# Patient Record
Sex: FEMALE | URBAN - NONMETROPOLITAN AREA
[De-identification: names, ages, dates, MRNs, and addresses within clinical notes are randomized per-mention and may not be internally consistent; named-entity substitution may affect disease eponyms.]

---

## 2022-04-01 ENCOUNTER — OFFICE VISIT (OUTPATIENT)
Dept: URBAN - NONMETROPOLITAN AREA CLINIC 2 | Facility: CLINIC | Age: 62
End: 2022-04-01

## 2022-06-02 ENCOUNTER — WEB ENCOUNTER (OUTPATIENT)
Dept: URBAN - NONMETROPOLITAN AREA CLINIC 13 | Facility: CLINIC | Age: 62
End: 2022-06-02

## 2022-06-08 ENCOUNTER — OFFICE VISIT (OUTPATIENT)
Dept: URBAN - NONMETROPOLITAN AREA CLINIC 13 | Facility: CLINIC | Age: 62
End: 2022-06-08
Payer: MEDICARE

## 2022-06-08 ENCOUNTER — WEB ENCOUNTER (OUTPATIENT)
Dept: URBAN - NONMETROPOLITAN AREA CLINIC 13 | Facility: CLINIC | Age: 62
End: 2022-06-08

## 2022-06-08 ENCOUNTER — LAB OUTSIDE AN ENCOUNTER (OUTPATIENT)
Dept: URBAN - NONMETROPOLITAN AREA CLINIC 13 | Facility: CLINIC | Age: 62
End: 2022-06-08

## 2022-06-08 VITALS
DIASTOLIC BLOOD PRESSURE: 85 MMHG | HEART RATE: 69 BPM | WEIGHT: 144 LBS | BODY MASS INDEX: 27.19 KG/M2 | SYSTOLIC BLOOD PRESSURE: 131 MMHG | HEIGHT: 61 IN

## 2022-06-08 DIAGNOSIS — Z12.11 COLON CANCER SCREENING: ICD-10-CM

## 2022-06-08 DIAGNOSIS — K52.9 INFLAMMATORY BOWEL DISEASE: ICD-10-CM

## 2022-06-08 DIAGNOSIS — R19.5 LOOSE BOWEL MOVEMENTS: ICD-10-CM

## 2022-06-08 PROCEDURE — 99204 OFFICE O/P NEW MOD 45 MIN: CPT | Performed by: INTERNAL MEDICINE

## 2022-06-08 RX ORDER — LEVOTHYROXINE SODIUM 75 UG/1
1 TABLET IN THE MORNING ON AN EMPTY STOMACH TABLET ORAL ONCE A DAY
Status: ACTIVE | COMMUNITY

## 2022-06-08 RX ORDER — MESALAMINE 375 MG/1
4 CAPSULES IN THE MORNING CAPSULE, EXTENDED RELEASE ORAL ONCE A DAY
Qty: 360 CAPSULE | Refills: 3 | OUTPATIENT
Start: 2022-06-08 | End: 2023-06-03

## 2022-06-08 RX ORDER — FENOFIBRATE 160 MG/1
1 TABLET TABLET ORAL ONCE A DAY
Status: ACTIVE | COMMUNITY

## 2022-06-08 RX ORDER — METOPROLOL SUCCINATE 25 MG/1
1 TABLET TABLET, FILM COATED, EXTENDED RELEASE ORAL ONCE A DAY
Status: ACTIVE | COMMUNITY

## 2022-06-08 RX ORDER — POLYETHYLENE GLYCOL 3350, SODIUM SULFATE, SODIUM CHLORIDE, POTASSIUM CHLORIDE, ASCORBIC ACID, SODIUM ASCORBATE 140-9-5.2G
AS DIRECTED KIT ORAL AS DIRECTED
Qty: 280 GRAM | Refills: 0 | OUTPATIENT
Start: 2022-06-08 | End: 2022-06-09

## 2022-06-12 LAB
A/G RATIO: 1.6
ALBUMIN: 4.9
ALKALINE PHOSPHATASE: 62
ALT (SGPT): 17
AST (SGOT): 25
BASO (ABSOLUTE): 0.1
BASOS: 1
BILIRUBIN, TOTAL: 0.2
BUN/CREATININE RATIO: 14
BUN: 13
C-REACTIVE PROTEIN, QUANT: 7
CALCIUM: 9.9
CARBON DIOXIDE, TOTAL: 22
CHLORIDE: 102
CREATININE: 0.91
EGFR: 70
EOS (ABSOLUTE): 0.2
EOS: 3
FERRITIN, SERUM: 85
GLOBULIN, TOTAL: 3
GLUCOSE: 93
HBSAG SCREEN: NEGATIVE
HEMATOCRIT: 43.3
HEMATOLOGY COMMENTS:: (no result)
HEMOGLOBIN: 14
HEP B CORE AB, TOT: NEGATIVE
HEP B SURFACE AB, QUAL: NON REACTIVE
IMMATURE CELLS: (no result)
IMMATURE GRANS (ABS): 0
IMMATURE GRANULOCYTES: 1
INTERPRETATION: (no result)
IRON BIND.CAP.(TIBC): 414
IRON SATURATION: 19
IRON: 79
LYMPHS (ABSOLUTE): 1.7
LYMPHS: 29
MCH: 29
MCHC: 32.3
MCV: 90
MONOCYTES(ABSOLUTE): 0.6
MONOCYTES: 10
NEUTROPHILS (ABSOLUTE): 3.4
NEUTROPHILS: 56
NRBC: (no result)
PLATELETS: 255
POTASSIUM: 4.1
PROTEIN, TOTAL: 7.9
QUANTIFERON CRITERIA: (no result)
QUANTIFERON INCUBATION: (no result)
QUANTIFERON MITOGEN VALUE: >10
QUANTIFERON NIL VALUE: 0.02
QUANTIFERON TB1 AG VALUE: 0.04
QUANTIFERON TB2 AG VALUE: 0.04
QUANTIFERON-TB GOLD PLUS: NEGATIVE
RBC: 4.82
RDW: 13.2
RFX TO HBC IGM: (no result)
SODIUM: 142
UIBC: 335
VITAMIN B12: 358
WBC: 6

## 2022-06-14 ENCOUNTER — LAB OUTSIDE AN ENCOUNTER (OUTPATIENT)
Dept: URBAN - NONMETROPOLITAN AREA CLINIC 2 | Facility: CLINIC | Age: 62
End: 2022-06-14

## 2022-06-20 LAB
C DIFFICILE TOXIN GENE NAA: NEGATIVE
CAMPYLOBACTER CULTURE: (no result)
E COLI SHIGA TOXIN EIA: NEGATIVE
GIARDIA LAMBLIA AG, EIA: NEGATIVE
Lab: (no result)
Lab: (no result)
OVA + PARASITE EXAM: (no result)
QUANTIFERON INCUBATION: (no result)
QUANTIFERON-TB GOLD PLUS: (no result)
REQUEST PROBLEM: (no result)
REQUEST PROBLEM: (no result)
SALMONELLA/SHIGELLA SCREEN: (no result)

## 2022-07-21 ENCOUNTER — OFFICE VISIT (OUTPATIENT)
Dept: URBAN - NONMETROPOLITAN AREA SURGERY CENTER 1 | Facility: SURGERY CENTER | Age: 62
End: 2022-07-21
Payer: MEDICARE

## 2022-07-21 DIAGNOSIS — K63.89 BACTERIAL OVERGROWTH SYNDROME: ICD-10-CM

## 2022-07-21 DIAGNOSIS — K50.10 CC (CROHN'S COLITIS): ICD-10-CM

## 2022-07-21 PROCEDURE — 45380 COLONOSCOPY AND BIOPSY: CPT | Performed by: INTERNAL MEDICINE

## 2022-07-21 PROCEDURE — G8907 PT DOC NO EVENTS ON DISCHARG: HCPCS | Performed by: INTERNAL MEDICINE

## 2022-07-21 RX ORDER — LEVOTHYROXINE SODIUM 75 UG/1
1 TABLET IN THE MORNING ON AN EMPTY STOMACH TABLET ORAL ONCE A DAY
Status: ACTIVE | COMMUNITY

## 2022-07-21 RX ORDER — FENOFIBRATE 160 MG/1
1 TABLET TABLET ORAL ONCE A DAY
Status: ACTIVE | COMMUNITY

## 2022-07-21 RX ORDER — MESALAMINE 375 MG/1
4 CAPSULES IN THE MORNING CAPSULE, EXTENDED RELEASE ORAL ONCE A DAY
Qty: 360 CAPSULE | Refills: 3 | Status: ACTIVE | COMMUNITY
Start: 2022-06-08 | End: 2023-06-03

## 2022-07-21 RX ORDER — METOPROLOL SUCCINATE 25 MG/1
1 TABLET TABLET, FILM COATED, EXTENDED RELEASE ORAL ONCE A DAY
Status: ACTIVE | COMMUNITY

## 2022-08-24 ENCOUNTER — TELEPHONE ENCOUNTER (OUTPATIENT)
Dept: URBAN - NONMETROPOLITAN AREA CLINIC 2 | Facility: CLINIC | Age: 62
End: 2022-08-24

## 2022-08-26 ENCOUNTER — TELEPHONE ENCOUNTER (OUTPATIENT)
Dept: URBAN - NONMETROPOLITAN AREA CLINIC 2 | Facility: CLINIC | Age: 62
End: 2022-08-26

## 2022-08-26 ENCOUNTER — LAB OUTSIDE AN ENCOUNTER (OUTPATIENT)
Dept: URBAN - NONMETROPOLITAN AREA CLINIC 2 | Facility: CLINIC | Age: 62
End: 2022-08-26

## 2022-09-05 ENCOUNTER — TELEPHONE ENCOUNTER (OUTPATIENT)
Dept: URBAN - NONMETROPOLITAN AREA CLINIC 2 | Facility: CLINIC | Age: 62
End: 2022-09-05

## 2022-09-13 ENCOUNTER — TELEPHONE ENCOUNTER (OUTPATIENT)
Dept: URBAN - NONMETROPOLITAN AREA CLINIC 2 | Facility: CLINIC | Age: 62
End: 2022-09-13

## 2022-09-13 ENCOUNTER — OFFICE VISIT (OUTPATIENT)
Dept: URBAN - NONMETROPOLITAN AREA CLINIC 2 | Facility: CLINIC | Age: 62
End: 2022-09-13

## 2022-10-27 ENCOUNTER — WEB ENCOUNTER (OUTPATIENT)
Dept: URBAN - NONMETROPOLITAN AREA CLINIC 2 | Facility: CLINIC | Age: 62
End: 2022-10-27

## 2022-10-31 ENCOUNTER — OFFICE VISIT (OUTPATIENT)
Dept: URBAN - NONMETROPOLITAN AREA CLINIC 2 | Facility: CLINIC | Age: 62
End: 2022-10-31
Payer: MEDICARE

## 2022-10-31 VITALS
HEIGHT: 61 IN | WEIGHT: 139.4 LBS | HEART RATE: 64 BPM | BODY MASS INDEX: 26.32 KG/M2 | SYSTOLIC BLOOD PRESSURE: 151 MMHG | DIASTOLIC BLOOD PRESSURE: 78 MMHG

## 2022-10-31 DIAGNOSIS — R93.5 ABNORMAL COMPUTED TOMOGRAPHY OF ABDOMEN AND PELVIS: ICD-10-CM

## 2022-10-31 DIAGNOSIS — K57.90 DIVERTICULOSIS: ICD-10-CM

## 2022-10-31 DIAGNOSIS — Z12.11 COLON CANCER SCREENING: ICD-10-CM

## 2022-10-31 DIAGNOSIS — K50.10 CROHN'S DISEASE OF COLON WITHOUT COMPLICATION: ICD-10-CM

## 2022-10-31 PROBLEM — 305058001: Status: ACTIVE | Noted: 2022-06-23

## 2022-10-31 PROBLEM — 15634181000119107: Status: ACTIVE | Noted: 2022-08-26

## 2022-10-31 PROBLEM — 397881000: Status: ACTIVE | Noted: 2022-10-31

## 2022-10-31 PROCEDURE — 99214 OFFICE O/P EST MOD 30 MIN: CPT | Performed by: INTERNAL MEDICINE

## 2022-10-31 RX ORDER — MESALAMINE 375 MG/1
4 CAPSULES IN THE MORNING CAPSULE, EXTENDED RELEASE ORAL ONCE A DAY
Qty: 360 CAPSULE | Refills: 3 | Status: ACTIVE | COMMUNITY
Start: 2022-06-08 | End: 2023-06-03

## 2022-10-31 RX ORDER — LEVOTHYROXINE SODIUM 75 UG/1
1 TABLET IN THE MORNING ON AN EMPTY STOMACH TABLET ORAL ONCE A DAY
Status: ACTIVE | COMMUNITY

## 2022-10-31 RX ORDER — FENOFIBRATE 160 MG/1
1 TABLET TABLET ORAL ONCE A DAY
Status: ACTIVE | COMMUNITY

## 2022-10-31 RX ORDER — MESALAMINE 375 MG/1
4 CAPSULES IN THE MORNING CAPSULE, EXTENDED RELEASE ORAL ONCE A DAY
Qty: 360 CAPSULE | Refills: 3 | OUTPATIENT

## 2022-10-31 RX ORDER — METOPROLOL SUCCINATE 25 MG/1
1 TABLET TABLET, FILM COATED, EXTENDED RELEASE ORAL ONCE A DAY
Status: ACTIVE | COMMUNITY

## 2022-10-31 NOTE — HPI-TODAY'S VISIT:
6/8/2022: Initial Gastroenterology Clinic Visit   62 year old female with past medical history of inflammatory bowel disease (reported Crohn's colitis) diagnosed in 2002 currently on Apriso 0.375 grams QID, hypercholesterolemia, hypothyroidism, who recently moved to [place], [place] who presents to [facility].  Diagnosed with reported history of Crohn's disease although we do not have biopsies of the small intestine that show evidence of Crohn's disease. Initially was not on medication from [id number] but due to findings of inflammation within the colon in 2019 was started on Apirso. Has been on Apriso 0.375 grams QID since 2019. Moved to [place] in October 2021 from [place].  Most recent evaluation in [place] showed a fecal calprotectin elevated at 300. Colonoscopy performed in 5/2021 which showed cecum and ascending colon biopsies within normal limits, transverse colon biopsies with mildly active chronic colitis with focal granuloma consistent with patient's history of Crohn's disease. Biopsies from the descending colon, sigmoid colon, rectum showed colonic mucosa within normal limits. Thre were no small intestine biopsies during the colonoscopy.  Ms. Tompkins has been off of her Apriso for 4 weeks. As a resuilt, has had urges to go to the bathroom. Previously having 2 bowel movements per day on the Apriso but currently having 4 bowel movements per day. Denies melena, hematochezia, unintentional weight loss.   Retied but previously worked as an .   Denies family history of colon polyps or colon cancer.  6/8/21022: CBC, chemistry panel, LFTs normal. Not immune to hepatitis B virus, quantiferon gold negative, serum iron normal, TIBC normal, ferritin normal, vitamin B12 normal, CRP normal. Giardia negative, stool culture negative, C difficile negative.  7/21/2022: Colonoscopy with normal terminal ileum with pathology returning as no significant abnormality, area of congested nodular mucosa in the ascending colon two folds distal to the ileocecal valve s/p biopsy with pathology returning as colonic mucosa with no significant abnormality, few medium mouthed diverticula in the sigmoid colon and descending colon, the colon was otherwise normal with biopsies of the cecum, ascending colon, transverse colon, descending colon, sigmoid colon, rectum returning as colonic mucosa with no significant abnormality.  8/24/2022: CT-Enterography IMPRESSION: 1. No bowel obstruction. No significant inflammatory changes of the small bowel. 2. There is a mildly dilated fluid-filled appendix with mild wall thickening however there is no adjacent stranding. This could be a chronic finding. Mucocele of the appendix could also be considered. 9/19/2022: CT Abdomen and Pelvis with Contrast  IMPRESSION: 1. The appendix is abnormally dilated and mostly fluid-filled but is only slightly enlarged since prior exam and there are no appreciable surrounding acute inflammatory changes to suggest acute appendicitis. 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 Consider either follow-up imaging or surgical consultation. 2. Cholelithiasis.  10/31/2022: Gastroenterology Follow-Up Visit Ms. Tompkins has been on Aprison 0.375 QID. Her bowel movements have decreased from 4 bowel movements per day to 1-2 soft brown bowel movements per day. Denies melena, hematochezia, abdominal pain, nausea, vomiting, diarrhea. She has been undergoing hepatitis B vaccination series through PCP.

## 2022-11-07 ENCOUNTER — TELEPHONE ENCOUNTER (OUTPATIENT)
Dept: URBAN - NONMETROPOLITAN AREA CLINIC 2 | Facility: CLINIC | Age: 62
End: 2022-11-07

## 2022-12-14 ENCOUNTER — APPOINTMENT (OUTPATIENT)
Dept: URBAN - NONMETROPOLITAN AREA CLINIC 45 | Age: 62
Setting detail: DERMATOLOGY
End: 2022-12-28

## 2022-12-14 DIAGNOSIS — L65.0 TELOGEN EFFLUVIUM: ICD-10-CM

## 2022-12-14 PROCEDURE — 99203 OFFICE O/P NEW LOW 30 MIN: CPT

## 2022-12-14 PROCEDURE — OTHER MIPS QUALITY: OTHER

## 2022-12-14 PROCEDURE — OTHER COUNSELING: OTHER

## 2022-12-14 PROCEDURE — OTHER TREATMENT REGIMEN: OTHER

## 2022-12-14 PROCEDURE — OTHER DIAGNOSIS COMMENT: OTHER

## 2022-12-14 ASSESSMENT — LOCATION SIMPLE DESCRIPTION DERM: LOCATION SIMPLE: POSTERIOR SCALP

## 2022-12-14 ASSESSMENT — LOCATION ZONE DERM: LOCATION ZONE: SCALP

## 2022-12-14 ASSESSMENT — LOCATION DETAILED DESCRIPTION DERM: LOCATION DETAILED: POSTERIOR MID-PARIETAL SCALP

## 2022-12-14 NOTE — PROCEDURE: COUNSELING
Detail Level: Zone
Patient Specific Counseling (Will Not Stick From Patient To Patient): Advised that she may see an uptick in shedding in the next few months, secondary to her most recent surgery.

## 2022-12-14 NOTE — PROCEDURE: TREATMENT REGIMEN
Plan: Obtain recent labs Dr. Lancaster general surgeon\\nMay continue elixir she has been using \\nRecommend minoxidil 5% once a day
Detail Level: Zone

## 2022-12-14 NOTE — PROCEDURE: DIAGNOSIS COMMENT
Detail Level: Simple
Comment: Chronic after multiple life and medical events that have stimulated TE.
Render Risk Assessment In Note?: no

## 2022-12-14 NOTE — PROCEDURE: MIPS QUALITY
Quality 130: Documentation Of Current Medications In The Medical Record: Current Medications Documented
Detail Level: Detailed
Quality 431: Preventive Care And Screening: Unhealthy Alcohol Use - Screening: Patient not identified as an unhealthy alcohol user when screened for unhealthy alcohol use using a systematic screening method
Quality 110: Preventive Care And Screening: Influenza Immunization: Influenza Immunization previously received during influenza season
Quality 47: Advance Care Plan: Advance Care Planning discussed and documented; advance care plan or surrogate decision maker documented in the medical record.
Quality 226: Preventive Care And Screening: Tobacco Use: Screening And Cessation Intervention: Patient screened for tobacco use and is an ex/non-smoker

## 2023-01-11 ENCOUNTER — TELEPHONE ENCOUNTER (OUTPATIENT)
Dept: URBAN - NONMETROPOLITAN AREA CLINIC 2 | Facility: CLINIC | Age: 63
End: 2023-01-11

## 2023-01-11 RX ORDER — MESALAMINE 375 MG/1
4 CAPSULES IN THE MORNING CAPSULE, EXTENDED RELEASE ORAL ONCE A DAY
Qty: 360 CAPSULE | Refills: 3
End: 2024-01-06

## 2023-02-23 ENCOUNTER — WEB ENCOUNTER (OUTPATIENT)
Dept: URBAN - NONMETROPOLITAN AREA CLINIC 2 | Facility: CLINIC | Age: 63
End: 2023-02-23

## 2023-02-27 ENCOUNTER — OFFICE VISIT (OUTPATIENT)
Dept: URBAN - NONMETROPOLITAN AREA CLINIC 13 | Facility: CLINIC | Age: 63
End: 2023-02-27

## 2023-02-28 ENCOUNTER — OFFICE VISIT (OUTPATIENT)
Dept: URBAN - NONMETROPOLITAN AREA CLINIC 2 | Facility: CLINIC | Age: 63
End: 2023-02-28
Payer: MEDICARE

## 2023-02-28 VITALS
WEIGHT: 145 LBS | BODY MASS INDEX: 27.38 KG/M2 | HEIGHT: 61 IN | DIASTOLIC BLOOD PRESSURE: 68 MMHG | HEART RATE: 24 BPM | SYSTOLIC BLOOD PRESSURE: 124 MMHG | TEMPERATURE: 98.4 F

## 2023-02-28 DIAGNOSIS — K50.10 CROHN'S DISEASE OF COLON WITHOUT COMPLICATION: ICD-10-CM

## 2023-02-28 DIAGNOSIS — D84.9 IMMUNOSUPPRESSED STATUS: ICD-10-CM

## 2023-02-28 PROBLEM — 38013005: Status: ACTIVE | Noted: 2023-02-28

## 2023-02-28 PROBLEM — 50440006: Status: ACTIVE | Noted: 2022-10-31

## 2023-02-28 PROCEDURE — 99214 OFFICE O/P EST MOD 30 MIN: CPT | Performed by: NURSE PRACTITIONER

## 2023-02-28 RX ORDER — METOPROLOL SUCCINATE 25 MG/1
1 TABLET TABLET, FILM COATED, EXTENDED RELEASE ORAL ONCE A DAY
Status: ACTIVE | COMMUNITY

## 2023-02-28 RX ORDER — MESALAMINE 375 MG/1
4 CAPSULES IN THE MORNING CAPSULE, EXTENDED RELEASE ORAL ONCE A DAY
Qty: 360 CAPSULE | Refills: 3 | Status: ACTIVE | COMMUNITY
End: 2024-01-06

## 2023-02-28 RX ORDER — FENOFIBRATE 160 MG/1
1 TABLET TABLET ORAL ONCE A DAY
Status: ACTIVE | COMMUNITY

## 2023-02-28 RX ORDER — LEVOTHYROXINE SODIUM 75 UG/1
1 TABLET IN THE MORNING ON AN EMPTY STOMACH TABLET ORAL ONCE A DAY
Status: ACTIVE | COMMUNITY

## 2023-02-28 NOTE — HPI-TODAY'S VISIT:
Patient is a 62-year-old female with a past medical history of Crohn's disease who presents for routine follow-up.  She is currently without GI symptoms and moving her bowels routinely.  She states she was initially diagnosed over 20 years ago in [place] where she is from.  She is new to the [place] area for the last year or so.  It was found during a routine screening colonoscopy.  She is more or less been on mesalamine this entire time.  She reports no active disease in the last few colonoscopies, which she typically gets about every 2 years.  Of note, she did have a fluid-filled appendix and of CT scan last year and underwent appendectomy with Dr. Lamb. Sb

## 2023-03-01 LAB
A/G RATIO: 1.6
ABSOLUTE BASOPHILS: 59
ABSOLUTE EOSINOPHILS: 241
ABSOLUTE LYMPHOCYTES: 1963
ABSOLUTE MONOCYTES: 481
ABSOLUTE NEUTROPHILS: 3757
ALBUMIN: 4.4
ALKALINE PHOSPHATASE: 64
ALT (SGPT): 14
AST (SGOT): 18
BASOPHILS: 0.9
BILIRUBIN, TOTAL: 0.3
BUN/CREATININE RATIO: (no result)
BUN: 15
CALCIUM: 9.4
CARBON DIOXIDE, TOTAL: 28
CHLORIDE: 103
CREATININE: 0.79
EGFR: 83
EOSINOPHILS: 3.7
GLOBULIN, TOTAL: 2.7
GLUCOSE: 104
HEMATOCRIT: 40.2
HEMOGLOBIN: 13.4
HS CRP: 3.5
LYMPHOCYTES: 30.2
MCH: 28.6
MCHC: 33.3
MCV: 85.7
MONOCYTES: 7.4
MPV: 12.9
NEUTROPHILS: 57.8
PLATELET COUNT: 220
POTASSIUM: 4
PROTEIN, TOTAL: 7.1
RDW: 13.5
RED BLOOD CELL COUNT: 4.69
SED RATE BY MODIFIED: 11
SODIUM: 140
WHITE BLOOD CELL COUNT: 6.5

## 2023-03-02 ENCOUNTER — WEB ENCOUNTER (OUTPATIENT)
Dept: URBAN - METROPOLITAN AREA CLINIC 92 | Facility: CLINIC | Age: 63
End: 2023-03-02

## 2023-07-25 ENCOUNTER — OFFICE VISIT (OUTPATIENT)
Dept: URBAN - NONMETROPOLITAN AREA CLINIC 2 | Facility: CLINIC | Age: 63
End: 2023-07-25

## 2023-08-29 ENCOUNTER — CLAIMS CREATED FROM THE CLAIM WINDOW (OUTPATIENT)
Dept: URBAN - METROPOLITAN AREA TELEHEALTH 2 | Facility: TELEHEALTH | Age: 63
End: 2023-08-29
Payer: MEDICARE

## 2023-08-29 DIAGNOSIS — D84.9 IMMUNOSUPPRESSED STATUS: ICD-10-CM

## 2023-08-29 DIAGNOSIS — K50.10 CROHN'S DISEASE OF COLON WITHOUT COMPLICATION: ICD-10-CM

## 2023-08-29 PROCEDURE — 99213 OFFICE O/P EST LOW 20 MIN: CPT | Performed by: NURSE PRACTITIONER

## 2023-08-29 RX ORDER — LEVOTHYROXINE SODIUM 75 UG/1
1 TABLET IN THE MORNING ON AN EMPTY STOMACH TABLET ORAL ONCE A DAY
Status: ACTIVE | COMMUNITY

## 2023-08-29 RX ORDER — METOPROLOL SUCCINATE 25 MG/1
1 TABLET TABLET, FILM COATED, EXTENDED RELEASE ORAL ONCE A DAY
Status: ACTIVE | COMMUNITY

## 2023-08-29 RX ORDER — FENOFIBRATE 160 MG/1
1 TABLET TABLET ORAL ONCE A DAY
Status: ACTIVE | COMMUNITY

## 2023-08-29 RX ORDER — MESALAMINE 375 MG/1
4 CAPSULES IN THE MORNING CAPSULE, EXTENDED RELEASE ORAL ONCE A DAY
Qty: 360 CAPSULE | Refills: 3 | Status: ACTIVE | COMMUNITY
End: 2024-01-06

## 2023-08-29 NOTE — HPI-TODAY'S VISIT:
Patient is a 62-year-old female with a past medical history of Crohn's disease who presents for routine follow-up.  She is currently without GI symptoms and moving her bowels routinely.  She states she was initially diagnosed over 20 years ago in [place] where she is from.  She is new to the [place] area for the last year or so.  It was found during a routine screening colonoscopy..  She reports no active disease in the last few colonoscopies, which she typically gets about every 2 years.   at her last OV, we stopped her mesalamine. she is currently on no medication for IBD and seems to be doing well. Of note, she did have a fluid-filled appendix and of CT scan last year and underwent appendectomy with Dr. Lamb. Sb

## 2023-09-20 ENCOUNTER — TELEPHONE ENCOUNTER (OUTPATIENT)
Dept: URBAN - NONMETROPOLITAN AREA CLINIC 2 | Facility: CLINIC | Age: 63
End: 2023-09-20

## 2023-10-17 ENCOUNTER — OFFICE VISIT (OUTPATIENT)
Dept: URBAN - NONMETROPOLITAN AREA CLINIC 2 | Facility: CLINIC | Age: 63
End: 2023-10-17
Payer: MEDICARE

## 2023-10-17 VITALS
WEIGHT: 134 LBS | DIASTOLIC BLOOD PRESSURE: 81 MMHG | HEIGHT: 61 IN | TEMPERATURE: 98.1 F | SYSTOLIC BLOOD PRESSURE: 129 MMHG | HEART RATE: 74 BPM | BODY MASS INDEX: 25.3 KG/M2

## 2023-10-17 DIAGNOSIS — R19.5 LOOSE STOOLS: ICD-10-CM

## 2023-10-17 DIAGNOSIS — D84.9 IMMUNOSUPPRESSED STATUS: ICD-10-CM

## 2023-10-17 DIAGNOSIS — K50.10 CROHN'S DISEASE OF COLON WITHOUT COMPLICATION: ICD-10-CM

## 2023-10-17 PROCEDURE — 99214 OFFICE O/P EST MOD 30 MIN: CPT | Performed by: NURSE PRACTITIONER

## 2023-10-17 RX ORDER — METOPROLOL SUCCINATE 25 MG/1
1 TABLET TABLET, FILM COATED, EXTENDED RELEASE ORAL ONCE A DAY
Status: ACTIVE | COMMUNITY

## 2023-10-17 RX ORDER — MESALAMINE 375 MG/1
4 CAPSULES IN THE MORNING CAPSULE, EXTENDED RELEASE ORAL ONCE A DAY
Qty: 360 CAPSULE | Refills: 3 | Status: ACTIVE | COMMUNITY
End: 2024-01-06

## 2023-10-17 RX ORDER — LEVOTHYROXINE SODIUM 75 UG/1
1 TABLET IN THE MORNING ON AN EMPTY STOMACH TABLET ORAL ONCE A DAY
Status: ACTIVE | COMMUNITY

## 2023-10-17 RX ORDER — FENOFIBRATE 160 MG/1
1 TABLET TABLET ORAL ONCE A DAY
Status: ACTIVE | COMMUNITY

## 2023-10-17 NOTE — PHYSICAL EXAM SKIN:
no rashes , no suspicious lesions
Photo Preface (Leave Blank If You Do Not Want): Photographs were obtained today
Detail Level: Detailed

## 2023-10-17 NOTE — HPI-TODAY'S VISIT:
Last OV Patient is a 62-year-old female with a past medical history of Crohn's disease who presents for routine follow-up.  She is currently without GI symptoms and moving her bowels routinely.  She states she was initially diagnosed over 20 years ago in [place] where she is from.  She is new to the [place] area for the last year or so.  It was found during a routine screening colonoscopy..  She reports no active disease in the last few colonoscopies, which she typically gets about every 2 years.   at her last OV, we stopped her mesalamine. she is currently on no medication for IBD and seems to be doing well. Of note, she did have a fluid-filled appendix and of CT scan last year and underwent appendectomy with Dr. Lamb. Placido 10/18/2023 patient is a 63-year-old female with a past medical history of Crohn's who presents for worsening loose stool.  We had actually stopped her mesalamine as she had been doing quite well for some time.  She unfortunately had to undergo brain surgery and subsequent radiation.  She developed some diarrhea following this which her neurologist thinks is actually related to her Keppra and not symmetric Crohn's flare.  She is currently weaning off of this medication and seems to be improving over the last week.  She was just encouraged to follow-up with GI as she had a Crohn's history.  She was not having any blood in her stool.  She is currently undergoing radiation at this time.placido

## 2023-10-18 ENCOUNTER — LAB OUTSIDE AN ENCOUNTER (OUTPATIENT)
Dept: URBAN - NONMETROPOLITAN AREA CLINIC 2 | Facility: CLINIC | Age: 63
End: 2023-10-18

## 2023-10-19 ENCOUNTER — WEB ENCOUNTER (OUTPATIENT)
Dept: URBAN - NONMETROPOLITAN AREA CLINIC 2 | Facility: CLINIC | Age: 63
End: 2023-10-19

## 2023-10-22 LAB
ADENOVIRUS F 40/41: NOT DETECTED
CALPROTECTIN, STOOL - QDX: (no result)
CAMPYLOBACTER: NOT DETECTED
CLOSTRIDIUM DIFFICILE: NOT DETECTED
ENTAMOEBA HISTOLYTICA: NOT DETECTED
ENTEROAGGREGATIVE E.COLI: NOT DETECTED
ENTEROTOXIGENIC E.COLI: NOT DETECTED
ESCHERICHIA COLI O157: NOT DETECTED
GIARDIA LAMBLIA: NOT DETECTED
NOROVIRUS GI/GII: NOT DETECTED
PANCREATICELASTASE ELISA, STOOL: (no result)
ROTAVIRUS A: NOT DETECTED
SALMONELLA SPP.: NOT DETECTED
SHIGA-LIKE TOXIN PRODUCING E.COLI: NOT DETECTED
SHIGELLA SPP. / ENTEROINVASIVE E.COLI: NOT DETECTED
VIBRIO PARAHAEMOLYTICUS: NOT DETECTED
VIBRIO SPP.: NOT DETECTED
YERSINIA ENTEROCOLITICA: NOT DETECTED

## 2023-10-23 ENCOUNTER — TELEPHONE ENCOUNTER (OUTPATIENT)
Dept: URBAN - NONMETROPOLITAN AREA CLINIC 2 | Facility: CLINIC | Age: 63
End: 2023-10-23

## 2023-11-01 ENCOUNTER — WEB ENCOUNTER (OUTPATIENT)
Dept: URBAN - NONMETROPOLITAN AREA CLINIC 2 | Facility: CLINIC | Age: 63
End: 2023-11-01

## 2023-12-06 ENCOUNTER — OFFICE VISIT (OUTPATIENT)
Dept: URBAN - NONMETROPOLITAN AREA CLINIC 2 | Facility: CLINIC | Age: 63
End: 2023-12-06
Payer: MEDICARE

## 2023-12-06 ENCOUNTER — LAB OUTSIDE AN ENCOUNTER (OUTPATIENT)
Dept: URBAN - NONMETROPOLITAN AREA CLINIC 2 | Facility: CLINIC | Age: 63
End: 2023-12-06

## 2023-12-06 VITALS
BODY MASS INDEX: 24.66 KG/M2 | WEIGHT: 130.6 LBS | SYSTOLIC BLOOD PRESSURE: 157 MMHG | DIASTOLIC BLOOD PRESSURE: 94 MMHG | TEMPERATURE: 98 F | HEART RATE: 76 BPM | HEIGHT: 61 IN

## 2023-12-06 DIAGNOSIS — D84.9 IMMUNOSUPPRESSED STATUS: ICD-10-CM

## 2023-12-06 DIAGNOSIS — K50.10 CROHN'S DISEASE OF COLON WITHOUT COMPLICATION: ICD-10-CM

## 2023-12-06 DIAGNOSIS — R19.5 LOOSE STOOLS: ICD-10-CM

## 2023-12-06 PROCEDURE — 99214 OFFICE O/P EST MOD 30 MIN: CPT | Performed by: NURSE PRACTITIONER

## 2023-12-06 RX ORDER — METOPROLOL SUCCINATE 25 MG/1
1 TABLET TABLET, FILM COATED, EXTENDED RELEASE ORAL ONCE A DAY
Status: ACTIVE | COMMUNITY

## 2023-12-06 RX ORDER — FENOFIBRATE 160 MG/1
1 TABLET TABLET ORAL ONCE A DAY
Status: ACTIVE | COMMUNITY

## 2023-12-06 RX ORDER — LEVOTHYROXINE SODIUM 75 UG/1
1 TABLET IN THE MORNING ON AN EMPTY STOMACH TABLET ORAL ONCE A DAY
Status: ACTIVE | COMMUNITY

## 2023-12-06 NOTE — HPI-TODAY'S VISIT:
Last OV Patient is a 62-year-old female with a past medical history of Crohn's disease who presents for routine follow-up.  She is currently without GI symptoms and moving her bowels routinely.  She states she was initially diagnosed over 20 years ago in [place] where she is from.  She is new to the [place] area for the last year or so.  It was found during a routine screening colonoscopy..  She reports no active disease in the last few colonoscopies, which she typically gets about every 2 years.   at her last OV, we stopped her mesalamine. she is currently on no medication for IBD and seems to be doing well. Of note, she did have a fluid-filled appendix and of CT scan last year and underwent appendectomy with Dr. Lamb. Sb 12/6/23 patient is a 63-year-old female with a past medical history of Crohn's who presents for worsening loose stool.  We had actually stopped her mesalamine as she had been doing quite well for some time.  She unfortunately had to undergo brain surgery and subsequent radiation.  She developed some diarrhea following this which her neurologist thinks is actually related to her Keppra and not symmetric Crohn's flare. she swapped this medication and GI symptoms resovled.  She was just encouraged to follow-up with GI as she had a Crohn's history.  She was not having any blood in her stool.  She has completed her  radiation at this time.placido

## 2024-03-04 ENCOUNTER — COLON (OUTPATIENT)
Dept: URBAN - METROPOLITAN AREA MEDICAL CENTER 1 | Facility: MEDICAL CENTER | Age: 64
End: 2024-03-04

## 2024-03-25 ENCOUNTER — COLON (OUTPATIENT)
Dept: URBAN - METROPOLITAN AREA MEDICAL CENTER 1 | Facility: MEDICAL CENTER | Age: 64
End: 2024-03-25

## 2024-03-25 RX ORDER — LEVOTHYROXINE SODIUM 75 UG/1
1 TABLET IN THE MORNING ON AN EMPTY STOMACH TABLET ORAL ONCE A DAY
Status: ACTIVE | COMMUNITY

## 2024-03-25 RX ORDER — METOPROLOL SUCCINATE 25 MG/1
1 TABLET TABLET, FILM COATED, EXTENDED RELEASE ORAL ONCE A DAY
Status: ACTIVE | COMMUNITY

## 2024-03-25 RX ORDER — FENOFIBRATE 160 MG/1
1 TABLET TABLET ORAL ONCE A DAY
Status: ACTIVE | COMMUNITY

## 2024-04-23 ENCOUNTER — OV EP (OUTPATIENT)
Dept: URBAN - NONMETROPOLITAN AREA CLINIC 2 | Facility: CLINIC | Age: 64
End: 2024-04-23
Payer: MEDICARE

## 2024-04-23 VITALS
TEMPERATURE: 97.9 F | DIASTOLIC BLOOD PRESSURE: 78 MMHG | HEIGHT: 61 IN | WEIGHT: 134 LBS | SYSTOLIC BLOOD PRESSURE: 140 MMHG | BODY MASS INDEX: 25.3 KG/M2 | HEART RATE: 72 BPM

## 2024-04-23 DIAGNOSIS — D84.9 IMMUNOSUPPRESSED STATUS: ICD-10-CM

## 2024-04-23 DIAGNOSIS — K50.10 CROHN'S DISEASE OF COLON WITHOUT COMPLICATION: ICD-10-CM

## 2024-04-23 DIAGNOSIS — R19.5 LOOSE STOOLS: ICD-10-CM

## 2024-04-23 PROCEDURE — 99214 OFFICE O/P EST MOD 30 MIN: CPT | Performed by: NURSE PRACTITIONER

## 2024-04-23 RX ORDER — FENOFIBRATE 160 MG/1
1 TABLET TABLET ORAL ONCE A DAY
Status: ACTIVE | COMMUNITY

## 2024-04-23 RX ORDER — METOPROLOL SUCCINATE 25 MG/1
1 TABLET TABLET, FILM COATED, EXTENDED RELEASE ORAL ONCE A DAY
Status: ACTIVE | COMMUNITY

## 2024-04-23 RX ORDER — LEVOTHYROXINE SODIUM 75 UG/1
1 TABLET IN THE MORNING ON AN EMPTY STOMACH TABLET ORAL ONCE A DAY
Status: ACTIVE | COMMUNITY

## 2024-04-23 RX ORDER — MESALAMINE 375 MG/1
4 CAPSULES IN THE MORNING CAPSULE, EXTENDED RELEASE ORAL ONCE A DAY
Qty: 360 CAPSULE | Refills: 3
End: 2025-04-18

## 2024-04-23 NOTE — HPI-TODAY'S VISIT:
Patient is a 64-year-old female with a past medical history of Crohn's disease who presents for routine follow-up.  She is currently without GI symptoms and moving her bowels routinely. Is having some back pain with standing now. wonders about her sciatica. She states she was initially diagnosed over 20 years ago in [place] where she is from.  She is new to the [place] area for the last year or so.  It was found during a routine screening colonoscopy..  She reports no active disease in the last few colonoscopies, which she typically gets about every 2 years. started having slight crohns flare off meds while on keppra. GI symptoms resolved, but recent colon with mild active disease. underwent appendectomy with Dr. Lamb in the past. . Sb

## 2024-05-06 ENCOUNTER — WEB ENCOUNTER (OUTPATIENT)
Dept: URBAN - NONMETROPOLITAN AREA CLINIC 2 | Facility: CLINIC | Age: 64
End: 2024-05-06

## 2024-07-23 ENCOUNTER — OFFICE VISIT (OUTPATIENT)
Dept: URBAN - NONMETROPOLITAN AREA CLINIC 2 | Facility: CLINIC | Age: 64
End: 2024-07-23
Payer: MEDICARE

## 2024-07-23 ENCOUNTER — DASHBOARD ENCOUNTERS (OUTPATIENT)
Age: 64
End: 2024-07-23

## 2024-07-23 VITALS
WEIGHT: 138 LBS | TEMPERATURE: 98 F | DIASTOLIC BLOOD PRESSURE: 78 MMHG | BODY MASS INDEX: 26.06 KG/M2 | HEART RATE: 70 BPM | SYSTOLIC BLOOD PRESSURE: 130 MMHG | HEIGHT: 61 IN

## 2024-07-23 DIAGNOSIS — K50.10 CROHN'S DISEASE OF COLON WITHOUT COMPLICATION: ICD-10-CM

## 2024-07-23 PROCEDURE — 99214 OFFICE O/P EST MOD 30 MIN: CPT | Performed by: NURSE PRACTITIONER

## 2024-07-23 RX ORDER — LEVOTHYROXINE SODIUM 75 UG/1
1 TABLET IN THE MORNING ON AN EMPTY STOMACH TABLET ORAL ONCE A DAY
Status: ACTIVE | COMMUNITY

## 2024-07-23 RX ORDER — MESALAMINE 375 MG/1
4 CAPSULES IN THE MORNING CAPSULE, EXTENDED RELEASE ORAL ONCE A DAY
Qty: 360 CAPSULE | Refills: 3

## 2024-07-23 RX ORDER — MESALAMINE 375 MG/1
4 CAPSULES IN THE MORNING CAPSULE, EXTENDED RELEASE ORAL ONCE A DAY
Qty: 360 CAPSULE | Refills: 3 | Status: ACTIVE | COMMUNITY
End: 2025-04-20

## 2024-07-23 RX ORDER — FENOFIBRATE 160 MG/1
1 TABLET TABLET ORAL ONCE A DAY
Status: ACTIVE | COMMUNITY

## 2024-07-23 RX ORDER — METOPROLOL SUCCINATE 25 MG/1
1 TABLET TABLET, FILM COATED, EXTENDED RELEASE ORAL ONCE A DAY
Status: ACTIVE | COMMUNITY

## 2024-07-23 NOTE — HPI-TODAY'S VISIT:
Patient is a 64-year-old female with a past medical history of Crohn's disease who presents for routine follow-up.  She is currently without GI symptoms and moving her bowels routinely. Is having some back pain with standing now. wonders about her sciatica. She states she was initially diagnosed over 20 years ago in [place] where she is from.  She is new to the [place] in recent years.  It was found during a routine screening colonoscopy..  She reports no active disease in the last few colonoscopies, which she typically gets about every 2 years. started having slight crohns flare off meds while on keppra for a seizure following a sx for a brain mass.  GI symptoms resolved, but recent colon with mild active disease. underwent appendectomy with Dr. Lamb in the past.  today, she is doing well. BMs are regular. she is going to be traveling back to [place] for sister in laws bday . Sb

## 2024-07-24 ENCOUNTER — TELEPHONE ENCOUNTER (OUTPATIENT)
Dept: URBAN - NONMETROPOLITAN AREA CLINIC 2 | Facility: CLINIC | Age: 64
End: 2024-07-24

## 2024-07-24 LAB
A/G RATIO: 1.6
ABSOLUTE BASOPHILS: 57
ABSOLUTE EOSINOPHILS: 227
ABSOLUTE LYMPHOCYTES: 1701
ABSOLUTE MONOCYTES: 416
ABSOLUTE NEUTROPHILS: 3900
ALBUMIN: 4.5
ALKALINE PHOSPHATASE: 70
ALT (SGPT): 16
AST (SGOT): 21
BASOPHILS: 0.9
BILIRUBIN, TOTAL: 0.3
BUN/CREATININE RATIO: (no result)
BUN: 15
CALCIUM: 9.6
CARBON DIOXIDE, TOTAL: 27
CHLORIDE: 101
CREATININE: 0.72
EGFR: 92
EOSINOPHILS: 3.6
GLOBULIN, TOTAL: 2.9
GLUCOSE: 89
HEMATOCRIT: 40.2
HEMOGLOBIN: 13.2
LYMPHOCYTES: 27
MCH: 28.4
MCHC: 32.8
MCV: 86.5
MONOCYTES: 6.6
MPV: 12
NEUTROPHILS: 61.9
PLATELET COUNT: 268
POTASSIUM: 4.2
PROTEIN, TOTAL: 7.4
RDW: 14.3
RED BLOOD CELL COUNT: 4.65
SODIUM: 139
WHITE BLOOD CELL COUNT: 6.3

## 2024-07-24 RX ORDER — MESALAMINE 375 MG/1
4 CAPSULES IN THE MORNING CAPSULE, EXTENDED RELEASE ORAL ONCE A DAY
Qty: 360 CAPSULE | Refills: 3
End: 2025-07-19

## 2024-10-24 ENCOUNTER — WEB ENCOUNTER (OUTPATIENT)
Dept: URBAN - NONMETROPOLITAN AREA CLINIC 2 | Facility: CLINIC | Age: 64
End: 2024-10-24

## 2025-01-23 ENCOUNTER — OFFICE VISIT (OUTPATIENT)
Dept: URBAN - NONMETROPOLITAN AREA CLINIC 2 | Facility: CLINIC | Age: 65
End: 2025-01-23
Payer: COMMERCIAL

## 2025-01-23 VITALS
OXYGEN SATURATION: 97 % | HEART RATE: 67 BPM | SYSTOLIC BLOOD PRESSURE: 150 MMHG | WEIGHT: 140.8 LBS | BODY MASS INDEX: 26.58 KG/M2 | HEIGHT: 61 IN | DIASTOLIC BLOOD PRESSURE: 93 MMHG

## 2025-01-23 DIAGNOSIS — K50.10 CROHN'S DISEASE OF COLON WITHOUT COMPLICATION: ICD-10-CM

## 2025-01-23 DIAGNOSIS — K21.9 CHRONIC GERD: ICD-10-CM

## 2025-01-23 DIAGNOSIS — L91.8 SKIN TAG: ICD-10-CM

## 2025-01-23 DIAGNOSIS — L65.9 HAIR LOSS: ICD-10-CM

## 2025-01-23 DIAGNOSIS — D84.9 IMMUNOSUPPRESSED STATUS: ICD-10-CM

## 2025-01-23 PROBLEM — 235595009: Status: ACTIVE | Noted: 2025-01-23

## 2025-01-23 PROCEDURE — 99214 OFFICE O/P EST MOD 30 MIN: CPT | Performed by: NURSE PRACTITIONER

## 2025-01-23 RX ORDER — MESALAMINE 0.38 G/1
CAPSULE ORAL
Qty: 360 CAPSULE | Status: ACTIVE | COMMUNITY

## 2025-01-23 RX ORDER — OXCARBAZEPINE 150 MG/1
TAKE 1 TABLET BY MOUTH ONCE DAILY TABLET ORAL
Qty: 30 EACH | Refills: 1 | Status: ACTIVE | COMMUNITY

## 2025-01-23 RX ORDER — MESALAMINE 375 MG/1
4 CAPSULES IN THE MORNING CAPSULE, EXTENDED RELEASE ORAL ONCE A DAY
Qty: 360 CAPSULE | Refills: 3 | Status: ACTIVE | COMMUNITY
End: 2025-07-19

## 2025-01-23 RX ORDER — METOPROLOL SUCCINATE 25 MG/1
1 TABLET TABLET, FILM COATED, EXTENDED RELEASE ORAL ONCE A DAY
Status: ACTIVE | COMMUNITY

## 2025-01-23 RX ORDER — MESALAMINE 375 MG/1
2 CAPSULES IN THE MORNING CAPSULE, EXTENDED RELEASE ORAL ONCE A DAY
Qty: 180 CAPSULE | Refills: 3

## 2025-01-23 RX ORDER — FENOFIBRATE 160 MG/1
1 TABLET TABLET ORAL ONCE A DAY
Status: ACTIVE | COMMUNITY

## 2025-01-23 RX ORDER — LEVOTHYROXINE SODIUM 75 UG/1
1 TABLET IN THE MORNING ON AN EMPTY STOMACH TABLET ORAL ONCE A DAY
Status: ACTIVE | COMMUNITY

## 2025-01-23 NOTE — HPI-TODAY'S VISIT:
Patient is a 64-year-old female with a past medical history of Crohn's disease who presents for routine follow-up.  She is currently without GI symptoms and moving her bowels routinely. Is having some back pain with standing now. wonders about her sciatica. She states she was initially diagnosed over 20 years ago in [place] where she is from.  She is new to the [place] in recent years.  It was found during a routine screening colonoscopy..  She reports no active disease in the last few colonoscopies, which she typically gets about every 2 years. started having slight crohns flare off meds while on keppra for a seizure following a sx for a brain mass.  GI symptoms resolved, but recent colon with mild active disease. underwent appendectomy with Dr. Lamb in the past.  today, she is doing well. BMs are regular. occasional reflux, but this is controlled with prn pepcid. has had some hairloss.  . Sb

## 2025-01-24 LAB
A/G RATIO: 1.4
ABSOLUTE BASOPHILS: 53
ABSOLUTE EOSINOPHILS: 263
ABSOLUTE LYMPHOCYTES: 2040
ABSOLUTE MONOCYTES: 533
ABSOLUTE NEUTROPHILS: 4613
ALBUMIN: 4.4
ALKALINE PHOSPHATASE: 52
ALT (SGPT): 17
AST (SGOT): 19
BASOPHILS: 0.7
BILIRUBIN, TOTAL: 0.3
BUN/CREATININE RATIO: (no result)
BUN: 15
C-REACTIVE PROTEIN, QUANT: <3
CALCIUM: 9.7
CARBON DIOXIDE, TOTAL: 26
CHLORIDE: 104
CREATININE: 0.73
EGFR: 91
EOSINOPHILS: 3.5
GLOBULIN, TOTAL: 3.2
GLUCOSE: 103
HEMATOCRIT: 41.3
HEMOGLOBIN: 13.4
IRON BIND.CAP.(TIBC): 480
IRON SATURATION: 15
IRON: 71
LYMPHOCYTES: 27.2
MCH: 28.4
MCHC: 32.4
MCV: 87.5
MONOCYTES: 7.1
MPV: 13.4
NEUTROPHILS: 61.5
PLATELET COUNT: 263
POTASSIUM: 4.2
PROTEIN, TOTAL: 7.6
RDW: 13.9
RED BLOOD CELL COUNT: 4.72
SODIUM: 141
VITAMIN B12: >2000
VITAMIN D,25-OH,TOTAL,IA: 55
WHITE BLOOD CELL COUNT: 7.5

## 2025-01-27 ENCOUNTER — WEB ENCOUNTER (OUTPATIENT)
Dept: URBAN - NONMETROPOLITAN AREA CLINIC 2 | Facility: CLINIC | Age: 65
End: 2025-01-27

## 2025-04-16 ENCOUNTER — APPOINTMENT (OUTPATIENT)
Dept: URBAN - NONMETROPOLITAN AREA CLINIC 45 | Age: 65
Setting detail: DERMATOLOGY
End: 2025-04-20

## 2025-04-16 DIAGNOSIS — L64.8 OTHER ANDROGENIC ALOPECIA: ICD-10-CM

## 2025-04-16 DIAGNOSIS — L21.8 OTHER SEBORRHEIC DERMATITIS: ICD-10-CM

## 2025-04-16 PROCEDURE — OTHER PRESCRIPTION MEDICATION MANAGEMENT: OTHER

## 2025-04-16 PROCEDURE — OTHER MIPS QUALITY: OTHER

## 2025-04-16 PROCEDURE — OTHER PRESCRIPTION: OTHER

## 2025-04-16 PROCEDURE — 99214 OFFICE O/P EST MOD 30 MIN: CPT

## 2025-04-16 PROCEDURE — OTHER COUNSELING: OTHER

## 2025-04-16 RX ORDER — MOMETASONE FUROATE 1 MG/ML
LOTION TOPICAL
Qty: 60 | Refills: 3 | Status: ERX | COMMUNITY
Start: 2025-04-16

## 2025-04-16 RX ORDER — KETOCONAZOLE 20 MG/ML
SHAMPOO, SUSPENSION TOPICAL AS DIRECTED
Qty: 120 | Refills: 5 | Status: ERX | COMMUNITY
Start: 2025-04-16

## 2025-04-16 RX ORDER — KETOCONAZOLE 20 MG/G
CREAM TOPICAL BID
Qty: 60 | Refills: 3 | Status: ERX | COMMUNITY
Start: 2025-04-16

## 2025-04-16 RX ORDER — HYDROCORTISONE 25 MG/G
CREAM TOPICAL AS DIRECTED
Qty: 30 | Refills: 3 | Status: ERX | COMMUNITY
Start: 2025-04-16

## 2025-04-16 ASSESSMENT — LOCATION SIMPLE DESCRIPTION DERM
LOCATION SIMPLE: LEFT CHEEK
LOCATION SIMPLE: LEFT SCALP
LOCATION SIMPLE: RIGHT SCALP

## 2025-04-16 ASSESSMENT — LOCATION DETAILED DESCRIPTION DERM
LOCATION DETAILED: RIGHT MEDIAL FRONTAL SCALP
LOCATION DETAILED: LEFT INFERIOR MEDIAL MALAR CHEEK
LOCATION DETAILED: LEFT MEDIAL FRONTAL SCALP

## 2025-04-16 ASSESSMENT — LOCATION ZONE DERM
LOCATION ZONE: FACE
LOCATION ZONE: SCALP

## 2025-04-16 NOTE — PROCEDURE: PRESCRIPTION MEDICATION MANAGEMENT
Plan: ketoconazole 2 % shampoo : shampoo 1-2 times a week.  Apply, let sit a few minutes then rinse.\\n\\nmometasone 0.1 % topical solution: Apply to scalp QD PRN\\n\\nketoconazole 2 % topical cream : Apply to face and ears twice a day as  for scaling ( can use for maintenance) \\n\\nhydrocortisone 2.5 % topical cream : Apply twice a day as needed for facial, eyebrow and ear scaling --flares o
Detail Level: Zone
Render In Strict Bullet Format?: No

## 2025-05-19 ENCOUNTER — APPOINTMENT (OUTPATIENT)
Dept: URBAN - NONMETROPOLITAN AREA CLINIC 45 | Age: 65
Setting detail: DERMATOLOGY
End: 2025-05-20

## 2025-05-19 DIAGNOSIS — L21.8 OTHER SEBORRHEIC DERMATITIS: ICD-10-CM

## 2025-05-19 DIAGNOSIS — L64.8 OTHER ANDROGENIC ALOPECIA: ICD-10-CM

## 2025-05-19 DIAGNOSIS — L82.1 OTHER SEBORRHEIC KERATOSIS: ICD-10-CM

## 2025-05-19 DIAGNOSIS — D18.0 HEMANGIOMA: ICD-10-CM

## 2025-05-19 PROBLEM — D18.01 HEMANGIOMA OF SKIN AND SUBCUTANEOUS TISSUE: Status: ACTIVE | Noted: 2025-05-19

## 2025-05-19 PROCEDURE — OTHER MIPS QUALITY: OTHER

## 2025-05-19 PROCEDURE — 99214 OFFICE O/P EST MOD 30 MIN: CPT

## 2025-05-19 PROCEDURE — OTHER PRESCRIPTION MEDICATION MANAGEMENT: OTHER

## 2025-05-19 PROCEDURE — OTHER COUNSELING: OTHER

## 2025-05-19 ASSESSMENT — LOCATION ZONE DERM
LOCATION ZONE: FACE
LOCATION ZONE: TRUNK
LOCATION ZONE: SCALP

## 2025-05-19 ASSESSMENT — LOCATION DETAILED DESCRIPTION DERM
LOCATION DETAILED: RIGHT MEDIAL FRONTAL SCALP
LOCATION DETAILED: SUBXIPHOID
LOCATION DETAILED: LEFT LATERAL ABDOMEN
LOCATION DETAILED: RIGHT MEDIAL UPPER BACK
LOCATION DETAILED: LEFT INFERIOR MEDIAL MALAR CHEEK
LOCATION DETAILED: LEFT MEDIAL FRONTAL SCALP

## 2025-05-19 ASSESSMENT — LOCATION SIMPLE DESCRIPTION DERM
LOCATION SIMPLE: LEFT SCALP
LOCATION SIMPLE: RIGHT UPPER BACK
LOCATION SIMPLE: ABDOMEN
LOCATION SIMPLE: LEFT CHEEK
LOCATION SIMPLE: RIGHT SCALP

## 2025-07-15 ENCOUNTER — APPOINTMENT (OUTPATIENT)
Dept: URBAN - NONMETROPOLITAN AREA CLINIC 45 | Age: 65
Setting detail: DERMATOLOGY
End: 2025-07-19

## 2025-07-15 DIAGNOSIS — L21.8 OTHER SEBORRHEIC DERMATITIS: ICD-10-CM

## 2025-07-15 DIAGNOSIS — L64.8 OTHER ANDROGENIC ALOPECIA: ICD-10-CM

## 2025-07-15 PROCEDURE — OTHER COUNSELING: OTHER

## 2025-07-15 PROCEDURE — 99214 OFFICE O/P EST MOD 30 MIN: CPT

## 2025-07-15 PROCEDURE — OTHER MIPS QUALITY: OTHER

## 2025-07-15 PROCEDURE — OTHER PRESCRIPTION MEDICATION MANAGEMENT: OTHER

## 2025-07-15 ASSESSMENT — LOCATION DETAILED DESCRIPTION DERM
LOCATION DETAILED: LEFT INFERIOR MEDIAL MALAR CHEEK
LOCATION DETAILED: RIGHT MEDIAL FRONTAL SCALP
LOCATION DETAILED: LEFT MEDIAL FRONTAL SCALP

## 2025-07-15 ASSESSMENT — LOCATION ZONE DERM
LOCATION ZONE: SCALP
LOCATION ZONE: FACE

## 2025-07-15 ASSESSMENT — LOCATION SIMPLE DESCRIPTION DERM
LOCATION SIMPLE: LEFT CHEEK
LOCATION SIMPLE: RIGHT SCALP
LOCATION SIMPLE: LEFT SCALP

## 2025-07-24 ENCOUNTER — OFFICE VISIT (OUTPATIENT)
Dept: URBAN - NONMETROPOLITAN AREA CLINIC 2 | Facility: CLINIC | Age: 65
End: 2025-07-24
Payer: COMMERCIAL

## 2025-07-24 DIAGNOSIS — K21.9 CHRONIC GERD: ICD-10-CM

## 2025-07-24 DIAGNOSIS — L91.8 SKIN TAG: ICD-10-CM

## 2025-07-24 DIAGNOSIS — K50.10 CROHN'S DISEASE OF COLON WITHOUT COMPLICATION: ICD-10-CM

## 2025-07-24 DIAGNOSIS — L65.9 HAIR LOSS: ICD-10-CM

## 2025-07-24 DIAGNOSIS — D84.9 IMMUNOSUPPRESSED STATUS: ICD-10-CM

## 2025-07-24 PROCEDURE — 99214 OFFICE O/P EST MOD 30 MIN: CPT | Performed by: NURSE PRACTITIONER

## 2025-07-24 RX ORDER — LEVOTHYROXINE SODIUM 75 UG/1
1 TABLET IN THE MORNING ON AN EMPTY STOMACH TABLET ORAL ONCE A DAY
Status: ACTIVE | COMMUNITY

## 2025-07-24 RX ORDER — MESALAMINE 375 MG/1
2 CAPSULES IN THE MORNING CAPSULE, EXTENDED RELEASE ORAL ONCE A DAY
Qty: 180 CAPSULE | Refills: 3 | Status: ACTIVE | COMMUNITY

## 2025-07-24 RX ORDER — OMEPRAZOLE 20 MG/1
1 CAPSULE 30 MINUTES BEFORE MORNING MEAL CAPSULE, DELAYED RELEASE ORAL ONCE A DAY
Qty: 30 | Refills: 11 | OUTPATIENT
Start: 2025-07-24

## 2025-07-24 RX ORDER — OXCARBAZEPINE 150 MG/1
TAKE 1 TABLET BY MOUTH ONCE DAILY TABLET ORAL
Qty: 30 EACH | Refills: 1 | Status: ACTIVE | COMMUNITY

## 2025-07-24 RX ORDER — MESALAMINE 0.38 G/1
CAPSULE ORAL
Qty: 360 CAPSULE | Status: ACTIVE | COMMUNITY

## 2025-07-24 RX ORDER — MESALAMINE 375 MG/1
2 CAPSULES IN THE MORNING CAPSULE, EXTENDED RELEASE ORAL ONCE A DAY
Qty: 180 CAPSULE | Refills: 3
Start: 2025-07-24

## 2025-07-24 RX ORDER — METOPROLOL SUCCINATE 25 MG/1
1 TABLET TABLET, FILM COATED, EXTENDED RELEASE ORAL ONCE A DAY
Status: ACTIVE | COMMUNITY

## 2025-07-24 RX ORDER — FENOFIBRATE 160 MG/1
1 TABLET TABLET ORAL ONCE A DAY
Status: ACTIVE | COMMUNITY

## 2025-07-24 NOTE — PHYSICAL EXAM CONSTITUTIONAL:
1 well developed, well nourished , in no acute distress , ambulating without difficulty , normal communication ability

## 2025-07-24 NOTE — HPI-TODAY'S VISIT:
7/24/25 Ines Tompkins, a 65-year-old female, presented for follow-up of IBD.  She continues to have anxiety and nervousness after brain surgery, with her most recent MRI showing stable findings. She also reported intermittent sharp abdominal pain, increased gas, belching, occasional constipation, and small amounts of rectal bleeding with wiping. Her reflux is not daily but can be severe; she avoids Tums due to prior medication interactions and uses Pepcid only as needed. She denied diarrhea and weight loss. She also raised concerns about ongoing hair loss and is planning to start a topical treatment after consulting with her GI provider. placido

## 2025-07-25 LAB
A/G RATIO: 1.7
ABSOLUTE BASOPHILS: 78
ABSOLUTE EOSINOPHILS: 281
ABSOLUTE LYMPHOCYTES: 1755
ABSOLUTE MONOCYTES: 616
ABSOLUTE NEUTROPHILS: 5070
ALBUMIN: 4.6
ALKALINE PHOSPHATASE: 49
ALT (SGPT): 15
AST (SGOT): 20
BASOPHILS: 1
BILIRUBIN, TOTAL: 0.3
BUN/CREATININE RATIO: (no result)
BUN: 13
C-REACTIVE PROTEIN, QUANT: <3
CALCIUM: 9.7
CARBON DIOXIDE, TOTAL: 29
CHLORIDE: 104
CREATININE: 0.73
EGFR: 90
EOSINOPHILS: 3.6
GLOBULIN, TOTAL: 2.7
GLUCOSE: 106
HEMATOCRIT: 41.7
HEMOGLOBIN: 13
IRON BIND.CAP.(TIBC): 466
IRON SATURATION: 20
IRON: 92
LYMPHOCYTES: 22.5
MCH: 28.3
MCHC: 31.2
MCV: 90.7
MONOCYTES: 7.9
MPV: 12.5
NEUTROPHILS: 65
PLATELET COUNT: 289
POTASSIUM: 4.1
PROTEIN, TOTAL: 7.3
RDW: 14.5
RED BLOOD CELL COUNT: 4.6
SODIUM: 141
WHITE BLOOD CELL COUNT: 7.8

## 2025-07-28 ENCOUNTER — TELEPHONE ENCOUNTER (OUTPATIENT)
Dept: URBAN - NONMETROPOLITAN AREA CLINIC 2 | Facility: CLINIC | Age: 65
End: 2025-07-28

## 2025-07-29 ENCOUNTER — TELEPHONE ENCOUNTER (OUTPATIENT)
Dept: URBAN - NONMETROPOLITAN AREA CLINIC 2 | Facility: CLINIC | Age: 65
End: 2025-07-29